# Patient Record
(demographics unavailable — no encounter records)

---

## 2025-06-03 NOTE — HISTORY OF PRESENT ILLNESS
[FreeTextEntry1] : new CPE [de-identified] :  35 year old F presenting to establish care and to discuss the following: -Ankle swelling - noticed just in the past few months -HTN - has had a few elevated readings. 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  -Weight - dealing with her weight since she gave birth to her daughter in . Has tried wegovy and ozempic and it worked but then insurance changed. She is on new insurance now. She has tried many diets. She is so concerned about her weight that she would be interested in bariatric surgery eval if GLP-1 isn't covered or working.  -Foot injury 2024 - laceration. Had stiches. Had MRI which was normal. Using insoles, but still having pain. -Migraines - None in 5-6 years. But now having occasional stress/tension headaches. Stressful work life currently.   PSHx:  None  Hospitalizations/ED visits:  None  Current Medications:  Aspirin 81mg for primary prevention  Allergies: Azithromycin - facial swelling    Social Hx: Alcohol: Occasionally  Tobacco: Hookah occasionally  Drugs: None Sexual activity: Yes, would like STI screen.  Diet: Ok  Exercise: At least twice per week.  Mood: Stressed - work is a lot.  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

## 2025-06-03 NOTE — HISTORY OF PRESENT ILLNESS
[FreeTextEntry1] : new CPE [de-identified] :  35 year old F presenting to establish care and to discuss the following: -Ankle swelling - noticed just in the past few months -HTN - has had a few elevated readings. 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  -Weight - dealing with her weight since she gave birth to her daughter in . Has tried wegovy and ozempic and it worked but then insurance changed. She is on new insurance now. She has tried many diets. She is so concerned about her weight that she would be interested in bariatric surgery eval if GLP-1 isn't covered or working.  -Foot injury 2024 - laceration. Had stiches. Had MRI which was normal. Using insoles, but still having pain. -Migraines - None in 5-6 years. But now having occasional stress/tension headaches. Stressful work life currently.   PSHx:  None  Hospitalizations/ED visits:  None  Current Medications:  Aspirin 81mg for primary prevention  Allergies: Azithromycin - facial swelling    Social Hx: Alcohol: Occasionally  Tobacco: Hookah occasionally  Drugs: None Sexual activity: Yes, would like STI screen.  Diet: Ok  Exercise: At least twice per week.  Mood: Stressed - work is a lot.  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

## 2025-06-03 NOTE — PHYSICAL EXAM
[No Acute Distress] : no acute distress [No Accessory Muscle Use] : no accessory muscle use [Clear to Auscultation] : lungs were clear to auscultation bilaterally [Normal Rate] : normal rate  [Regular Rhythm] : with a regular rhythm [Normal S1, S2] : normal S1 and S2 [Soft] : abdomen soft [Non Tender] : non-tender [No Joint Swelling] : no joint swelling [No Rash] : no rash [Coordination Grossly Intact] : coordination grossly intact [Normal Affect] : the affect was normal [Normal Insight/Judgement] : insight and judgment were intact [de-identified] : Acanthosis nigricans and skin tags

## 2025-06-03 NOTE — ASSESSMENT
[FreeTextEntry1] :   EKG not done - pt needs to RTC 1 month to review BP - will obtain EKG at that time   I have spent 30 minutes on the encounter which excludes time spent on the preventive service [Vaccines Reviewed] : Immunizations reviewed today. Please see immunization details in the vaccine log within the immunization flowsheet.

## 2025-06-03 NOTE — PHYSICAL EXAM
[No Acute Distress] : no acute distress [No Accessory Muscle Use] : no accessory muscle use [Clear to Auscultation] : lungs were clear to auscultation bilaterally [Normal Rate] : normal rate  [Regular Rhythm] : with a regular rhythm [Normal S1, S2] : normal S1 and S2 [Soft] : abdomen soft [Non Tender] : non-tender [No Joint Swelling] : no joint swelling [No Rash] : no rash [Coordination Grossly Intact] : coordination grossly intact [Normal Affect] : the affect was normal [Normal Insight/Judgement] : insight and judgment were intact [de-identified] : Acanthosis nigricans and skin tags

## 2025-06-03 NOTE — REVIEW OF SYSTEMS
[Fever] : no fever [Chest Pain] : no chest pain [Palpitations] : no palpitations [Lower Ext Edema] : no lower extremity edema [Shortness Of Breath] : no shortness of breath [Wheezing] : no wheezing [Dyspnea on Exertion] : no dyspnea on exertion [Abdominal Pain] : no abdominal pain [Nausea] : no nausea [Constipation] : no constipation [Diarrhea] : diarrhea [Vomiting] : no vomiting [Dysuria] : no dysuria [Joint Pain] : no joint pain

## 2025-06-03 NOTE — HEALTH RISK ASSESSMENT
[Yes] : Yes [Monthly or less (1 pt)] : Monthly or less (1 point) [1 or 2 (0 pts)] : 1 or 2 (0 points) [Never (0 pts)] : Never (0 points) [0] : 2) Feeling down, depressed, or hopeless: Not at all (0) [MFR2Wfnys] : 0 [Never] : Never

## 2025-06-03 NOTE — HEALTH RISK ASSESSMENT
[Yes] : Yes [Monthly or less (1 pt)] : Monthly or less (1 point) [1 or 2 (0 pts)] : 1 or 2 (0 points) [Never (0 pts)] : Never (0 points) [0] : 2) Feeling down, depressed, or hopeless: Not at all (0) [[id number]] : 0 [Never] : Never

## 2025-06-11 NOTE — REVIEW OF SYSTEMS
[Fever] : no fever [Chest Pain] : no chest pain [Palpitations] : no palpitations [Lower Ext Edema] : no lower extremity edema [Shortness Of Breath] : no shortness of breath [Dyspnea on Exertion] : no dyspnea on exertion [Cough] : no cough [Nausea] : no nausea [Abdominal Pain] : no abdominal pain [Constipation] : no constipation [Diarrhea] : diarrhea [Vomiting] : no vomiting [Joint Pain] : no joint pain [Dysuria] : no dysuria [Itching] : no itching [Muscle Pain] : no muscle pain [Skin Rash] : no skin rash

## 2025-06-11 NOTE — HISTORY OF PRESENT ILLNESS
[Home] : at home, [unfilled] , at the time of the visit. [Medical Office: ([facility])___] : at the medical office located in  [Telehealth (audio & video)] : This visit was provided via telehealth using real-time 2-way audio visual technology. [Verbal consent obtained from patient] : the patient, [unfilled] [FreeTextEntry1] : Follow up on labs [de-identified] : 35 year old female presenting via telehealth after initial appt to discuss labs: -Triglycerides 515, , and ApoB 143 --> pt's appt was in the afternoon, she thinks she had a meal prior, so we will start with a repeat lipid profile. Ordered and pt provided with [facility] lab locations. Discussed possible need for medications if still elevated (fibrate or omega 3 fatty acid) and nutrition referral to assist with diet changes. -Vit D 15 --> sent high dose vitamin D for 2 months -HTN - pt will continue to monitor at home and will RTC if consistently elevated to start medications. Current home readings have been up and down. We also discussed how weight loss can bring down BP. -Pre-DM - A1C 5.9%. 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 Pt working on diet/lifestyle changes. Pt noted again her interest in bariatric surgery referral if GLP-1 not covered.

## 2025-06-19 NOTE — HISTORY OF PRESENT ILLNESS
[N] : Patient does not use contraception [Y] : Positive pregnancy history [Currently Active] : currently active [Men] : men [No] : No [Yes] : Yes [FreeTextEntry1] : GYN Annual Visit   Pt is a 36yo P1 presenting for GYN annual visit. Known PCOS, LMP 6 mo ago and PCOS diagnosed 15 yrs ago.    GYNHx: H/o PCOS h/o small fibroids Remote h/o chlamydia sp treatment S/p HPV vaccine series Sexually active w men Contraception - not using Last Pap 2 yrs ago - remote h/o abnormal pap   OBHx: TABx1, NSVDx1 PMH: HLD, HTN, obesity, asthma, PCOS, vit D deficiency PSH: Denies SocHx: Social alcohol use, Denies drug and tobacco use. FamHx: Denies FH breast, colon, GYN cancer Meds: Aspirin and vitamin D Allergies: Azithromycin (facial swelling) [TextBox_102] : PCOS [LMPDate] : 12/2024 [PGHxTotal] : 2 [[facility] Campus[facility]] : 1 [TextBox_6] : 12/2024 [TextBox_11] : PCOS

## 2025-06-19 NOTE — PLAN
[FreeTextEntry1] : Pt is a 36 yo P1 presenting for GYN annual visit, also with amenorrhea likely 2/2 PCOS.   #HCM -Pap UTD per pt - pt to send record  -Offered contraception, pt declines  -S/p HPV vaccine series  - Vaginitis swab  #Secondary amenorrhea - likely 2/2 PCOS - Upreg today - Progesterone challenge sent to pharmacy. Discussed should do q3mo to prevent hyperplasia - Offered IUD vs CHC and pt declines for now   Addendum: Pt left prior to leaving urine sample. Pt states she will take upreg at home prior to provera challenge.   Follow up PRN or 1 year for next annual. Pt aware to RTC sooner if does not have menses with provera challenge.

## 2025-06-19 NOTE — PHYSICAL EXAM
[Chaperoned Physical Exam] : A chaperone was present in the examining room during all aspects of the physical examination. [MA] : MA [FreeTextEntry2] : Yesi [Appropriately responsive] : appropriately responsive [Alert] : alert [No Acute Distress] : no acute distress [Soft] : soft [Non-tender] : non-tender [Non-distended] : non-distended [Oriented x3] : oriented x3 [Examination Of The Breasts] : a normal appearance [No Masses] : no breast masses were palpable [Labia Majora] : normal [Labia Minora] : normal [Normal] : normal [Uterine Adnexae] : normal [FreeTextEntry4] : +white discharge

## 2025-07-16 NOTE — END OF VISIT
[FreeTextEntry3] : All medical record entries made by the Scribe were at my, Dr. James Medellin , direction and personally dictated by me on 07/16/2025 . I have reviewed the chart and agree that the record accurately reflects my personal performance of the history, physical exam, assessment and plan. I have also personally directed, reviewed, and agreed with the chart.  [Time Spent: ___ minutes] : I have spent [unfilled] minutes of time on the encounter which excludes teaching and separately reported services.

## 2025-07-16 NOTE — ASSESSMENT
[FreeTextEntry1] : Verona Roberson is a 35-year-old female presents to see me today for an initial consultation. Patient has a history of PCOS and sleep apnea distribution. Patient reports a prior pregnancy in 2006 but has been unable to conceive since. We reviewed her recent lab results, which show elevated triglycerides and apolipoprotein B levels. She previously used Ozempic for six months approximately two years ago but was essentially non-responsive to the medications.  We discussed at length surgical and non-surgical options and that non-surgical approaches are unlikely to lead to long term, sustained weight loss. We also discussed that surgery alone is unlikely to be successful but should rather be seen as a tool for weight loss to be integrated with physical activity and nutritional counseling. At this time, believe MDS is the best treatment option going forward.  Will order an endoscopy today and begin bariatric work-up for a possible MDS.

## 2025-07-16 NOTE — ADDENDUM
[FreeTextEntry1] :   Documented by Angella Johnson acting as a scribe for Dr. James Medellin  on 07/16/2025  .

## 2025-07-16 NOTE — HISTORY OF PRESENT ILLNESS
[de-identified] : Verona Roberson is a 35-year-old female presents to see me today for an initial consultation. Patient has a history of PCOS and sleep apnea distribution. Patient reports a prior pregnancy in 2006 but has been unable to conceive since. We reviewed her recent lab results, which show elevated triglycerides and apolipoprotein B levels. She previously used Ozempic for six months approximately two years ago but was essentially non-responsive to the medications. We discussed at length surgical and non-surgical options and that non-surgical approaches are unlikely to lead to long term, sustained weight loss. We also discussed that surgery alone is unlikely to be successful but should rather be seen as a tool for weight loss to be integrated with physical activity and nutritional counseling. At this time, believe MDS is the best treatment option going forward.  Will order an endoscopy today and begin bariatric work-up for a possible MDS.